# Patient Record
Sex: FEMALE | Race: WHITE | NOT HISPANIC OR LATINO | URBAN - METROPOLITAN AREA
[De-identification: names, ages, dates, MRNs, and addresses within clinical notes are randomized per-mention and may not be internally consistent; named-entity substitution may affect disease eponyms.]

---

## 2019-05-10 ENCOUNTER — OFFICE VISIT (OUTPATIENT)
Dept: URBAN - METROPOLITAN AREA CLINIC 40 | Facility: CLINIC | Age: 79
End: 2019-05-10
Payer: MEDICARE

## 2019-05-10 DIAGNOSIS — H52.4 PRESBYOPIA: ICD-10-CM

## 2019-05-10 DIAGNOSIS — E11.9 TYPE 2 DIABETES MELLITUS W/O COMPLICATION: ICD-10-CM

## 2019-05-10 DIAGNOSIS — H35.363 DRUSEN (DEGENERATIVE) OF MACULA, BILATERAL: Primary | ICD-10-CM

## 2019-05-10 PROCEDURE — 92014 COMPRE OPH EXAM EST PT 1/>: CPT | Performed by: OPTOMETRIST

## 2019-05-10 RX ORDER — APIXABAN 5 MG/1
5 MG TABLET, FILM COATED ORAL
Qty: 0 | Refills: 0 | Status: ACTIVE
Start: 2019-02-01

## 2019-05-10 ASSESSMENT — KERATOMETRY
OS: 46.50
OD: 46.50

## 2019-05-10 ASSESSMENT — VISUAL ACUITY
OD: 20/30
OS: 20/40

## 2019-05-10 ASSESSMENT — INTRAOCULAR PRESSURE
OD: 12
OS: 12

## 2019-05-10 NOTE — IMPRESSION/PLAN
Impression: Type 2 diabetes mellitus w/o complication: M18.7. Plan: Diabetes type II: no background retinopathy, no signs of neovascularization noted. Discussed ocular and systemic benefits of blood sugar control.

## 2020-01-01 ENCOUNTER — OFFICE VISIT (OUTPATIENT)
Dept: URBAN - METROPOLITAN AREA CLINIC 23 | Facility: CLINIC | Age: 80
End: 2020-01-01
Payer: MEDICARE

## 2020-01-01 DIAGNOSIS — H35.3121 NONEXUDATIVE AGE-RELATED MACULAR DEGENERATION OF LEFT EYE, EARLY DRY STAGE: ICD-10-CM

## 2020-01-01 DIAGNOSIS — E11.3292 TYPE 2 DIAB W MILD NONPRLF DIABETIC RTNOP W/O MACULAR EDEMA, LEFT EYE: Primary | ICD-10-CM

## 2020-01-01 PROCEDURE — 92014 COMPRE OPH EXAM EST PT 1/>: CPT | Performed by: OPTOMETRIST

## 2020-01-01 ASSESSMENT — INTRAOCULAR PRESSURE
OD: 8
OS: 9

## 2020-01-01 ASSESSMENT — KERATOMETRY
OS: 47.00
OD: 47.25

## 2020-05-28 NOTE — IMPRESSION/PLAN
Impression: Type 2 diab w mild nonprlf diabetic rtnop w/o macular edema, left eye: E11.3292. Plan: Diabetes type II: mild background diabetic retinopathy, no signs of neovascularization noted. No treatment necessary at this time. Patient was instructed to monitor vision for sudden changes and to call if visual changes noted. Discussed ocular and systemic benefits of blood sugar control. Optos done. Monitor.

## 2020-05-28 NOTE — IMPRESSION/PLAN
Impression: Nonexudative age-related macular degeneration of left eye, early dry stage: H35.3121. Plan: Discussed findings. Advised pt that macular is reason for being fuzzy OS. Monitor.